# Patient Record
Sex: FEMALE | NOT HISPANIC OR LATINO | Employment: FULL TIME | URBAN - METROPOLITAN AREA
[De-identification: names, ages, dates, MRNs, and addresses within clinical notes are randomized per-mention and may not be internally consistent; named-entity substitution may affect disease eponyms.]

---

## 2023-12-20 ENCOUNTER — OFFICE VISIT (OUTPATIENT)
Dept: URGENT CARE | Age: 33
End: 2023-12-20
Payer: COMMERCIAL

## 2023-12-20 VITALS
DIASTOLIC BLOOD PRESSURE: 72 MMHG | SYSTOLIC BLOOD PRESSURE: 118 MMHG | TEMPERATURE: 98.2 F | HEART RATE: 80 BPM | RESPIRATION RATE: 16 BRPM | OXYGEN SATURATION: 100 %

## 2023-12-20 DIAGNOSIS — R25.2 LEG CRAMPING: Primary | ICD-10-CM

## 2023-12-20 PROCEDURE — G0382 LEV 3 HOSP TYPE B ED VISIT: HCPCS | Performed by: STUDENT IN AN ORGANIZED HEALTH CARE EDUCATION/TRAINING PROGRAM

## 2023-12-20 NOTE — PROGRESS NOTES
[facility]        NAME: Rain Alberto is a 33 y.o. female  : 1990    MRN: [id number]  DATE: 2023  TIME: 3:29 PM    Assessment and Orders   Leg cramping [R25.2]  1. Leg cramping  Ambulatory Referral to Family Practice            Plan and Discussion      Left lower leg is not swollen and there is no bruising or erythema.  Patient has already undergone an ultrasound and x-ray, both of which were negative per the patient.  Will refer her to family medicine as she should have a full blood panel done to check for electrolyte disturbances, especially magnesium.  Patient may need further workup for PAD.       Discussed ED precautions including (but not limited to)  Difficultly breathing or shortness of breath  Chest pain  Acutely worsening symptoms.     Risks and benefits discussed. Patient understands and agrees with the plan.     Follow up with PCP.     Chief Complaint     Chief Complaint   Patient presents with    Foot Pain     Patient having left lower leg pain since . She was seen in ER in [place] where they did a xray and ultra sound and gave her ibpropen 600mg. Patient still having left lower leg pain         History of Present Illness       Patient has been having lower leg pain since the beginning of December. No injury. Per patient she has had an ultrasound and x-ray done in [place], both of which were negative.  Patient states she only has pain when she is walking.  She states that it feels like a cramping pain in the lower portion of her leg and foot.  She has full range of motion in the ankle and toes.  No skin changes.  Patient does not have a primary care doctor in the area.  Patient states that she has been remaining well-hydrated.  No fevers.  Otherwise feels well.        Review of Systems   Review of Systems  As stated above     Current Medications     No current outpatient medications on file.    Current Allergies     Allergies as of 2023    (Not on  File)            The following portions of the patient's history were reviewed and updated as appropriate: allergies, current medications, past family history, past medical history, past social history, past surgical history and problem list.     No past medical history on file.    Past Surgical History:   Procedure Laterality Date     SECTION  2013    TUBAL LIGATION Bilateral        Family History   Problem Relation Age of Onset    Diabetes Mother     Hypertension Mother          Medications have been verified.        Objective   /72 (BP Location: Left arm, Patient Position: Sitting, Cuff Size: Adult)   Pulse 80   Temp 98.2 °F (36.8 °C) (Tympanic)   Resp 16   SpO2 100%   No LMP recorded.       Physical Exam     Physical Exam  Constitutional:       General: She is not in acute distress.  Pulmonary:      Effort: No respiratory distress.   Musculoskeletal:         General: Tenderness present. No deformity or signs of injury.      Left lower leg: Tenderness (some pain in lower calf muscle) present. No swelling, deformity, lacerations or bony tenderness. No edema.      Left ankle: No swelling, deformity, ecchymosis or lacerations. No tenderness. Normal range of motion.      Comments: Unable to reproduce pain. Patient states she only has pain with walking.    Neurological:      General: No focal deficit present.      Mental Status: She is alert and oriented to person, place, and time.   Psychiatric:         Behavior: Behavior normal.               Casandra Rey DO